# Patient Record
Sex: MALE | Race: WHITE | HISPANIC OR LATINO | Employment: STUDENT | URBAN - METROPOLITAN AREA
[De-identification: names, ages, dates, MRNs, and addresses within clinical notes are randomized per-mention and may not be internally consistent; named-entity substitution may affect disease eponyms.]

---
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
## 2024-01-19 ENCOUNTER — TELEPHONE (OUTPATIENT)
Dept: PEDIATRICS | Facility: CLINIC | Age: 19
End: 2024-01-19

## 2024-01-19 NOTE — LETTER
January 19, 2024    Bobby Melendrez  [address] [place] [address]             [facility] [address] [place] [address]  Phone: [phone]  Fax: [phone] Dear parent / guardian of Bobby Melendrez,     As your child grows, we grow with them. It may seem like only yesterday that you stepped into your pediatricians office for the first time, but now you and your teenager need to think about their transition to adult healthcare. We make it easy to find the care your teen needs.     When your child turns 18 years old, they will need a physician trained and experienced in providing adult care such as an internal medicine or family medicine doctor. We understand that this change can be overwhelming for you and your family. However, if you and your child have reviewed your healthcare options, it can be a smooth step on the path to adulthood.      Continuing your childs care with one of [facility] trusted providers will make this transition as seamless as possible. Visit [email or web address] to learn about each providers unique qualifications and special areas of interest. Then, call [phone] or visit [email or web address] to schedule an appointment with an adult provider who will best fit your needs.     You and your teen can expect the same excellent treatment and personalized healthcare they received from their pediatrician as well as continued access to:     Coordinated Care - All of our providers work from a single medical record to understand your childs health history and provide personalized care.     [facility] - Schedule appointments, message your provider, view test results, request refills and more -- all online.     Convenient Appointments - Evening and weekend office hours and 24/7 virtual visits mean were always here to help.     As always, thank you for choosing [facility] as your trusted healthcare provider. It is our privilege to meet your medical and wellness  needs.     Sincerely,    [facility]

## 2024-02-21 ENCOUNTER — OCCUPATIONAL HEALTH (OUTPATIENT)
Dept: URGENT CARE | Facility: CLINIC | Age: 19
End: 2024-02-21

## 2024-02-21 DIAGNOSIS — Z02.1 DRUG SCREENING, PRE-EMPLOYMENT: Primary | ICD-10-CM

## 2024-02-21 LAB
CTP QC/QA: YES
POC 10 PANEL DRUG SCREEN: NEGATIVE

## 2024-02-21 PROCEDURE — 80305 DRUG TEST PRSMV DIR OPT OBS: CPT | Mod: S$GLB,,, | Performed by: FAMILY MEDICINE

## 2024-05-13 ENCOUNTER — OCCUPATIONAL HEALTH (OUTPATIENT)
Dept: URGENT CARE | Facility: CLINIC | Age: 19
End: 2024-05-13

## 2024-05-13 VITALS — HEIGHT: 69 IN | BODY MASS INDEX: 19.55 KG/M2 | WEIGHT: 132 LBS

## 2024-05-13 DIAGNOSIS — Z00.00 ENCOUNTER FOR PHYSICAL EXAMINATION: Primary | ICD-10-CM

## 2024-05-13 PROCEDURE — 72110 X-RAY EXAM L-2 SPINE 4/>VWS: CPT | Mod: S$GLB,,, | Performed by: RADIOLOGY

## 2024-05-13 PROCEDURE — 92552 PURE TONE AUDIOMETRY AIR: CPT | Mod: S$GLB,,, | Performed by: FAMILY MEDICINE

## 2024-05-13 PROCEDURE — 71045 X-RAY EXAM CHEST 1 VIEW: CPT | Mod: S$GLB,,, | Performed by: RADIOLOGY

## 2024-05-13 PROCEDURE — 97750 PHYSICAL PERFORMANCE TEST: CPT | Mod: S$GLB,,, | Performed by: FAMILY MEDICINE

## 2024-05-13 PROCEDURE — 94010 BREATHING CAPACITY TEST: CPT | Mod: S$GLB,,, | Performed by: FAMILY MEDICINE

## 2024-05-13 PROCEDURE — 99499 UNLISTED E&M SERVICE: CPT | Mod: S$GLB,,, | Performed by: FAMILY MEDICINE

## 2024-05-13 PROCEDURE — 80305 DRUG TEST PRSMV DIR OPT OBS: CPT | Mod: S$GLB,,, | Performed by: FAMILY MEDICINE

## 2024-05-13 PROCEDURE — 99080 SPECIAL REPORTS OR FORMS: CPT | Mod: S$GLB,,, | Performed by: FAMILY MEDICINE
